# Patient Record
(demographics unavailable — no encounter records)

---

## 2024-10-11 NOTE — HISTORY OF PRESENT ILLNESS
[de-identified] : anemia  [FreeTextEntry6] : 16 year old female presenting for follow-up of anemia.   Pt reports history of anemia but never took iron supplementation. Pt complains of frequent headaches. Pt reports that she sometimes has shortness of breath. Pt denies difficulty concentrating, frequent stomachaches. Pt denies cravings for ice or things that are not food.   Pt eats a normal diet including meat. Pt does not drink a lot of milk.   Pt has regular, monthly period that lasted 7-9 days prior to the patch but period is shortener now on the patch. Pt denies blood in urine or stool. No history of constipation.

## 2024-10-11 NOTE — HISTORY OF PRESENT ILLNESS
[de-identified] : anemia  [FreeTextEntry6] : 16 year old female presenting for follow-up of anemia.   Pt reports history of anemia but never took iron supplementation. Pt complains of frequent headaches. Pt reports that she sometimes has shortness of breath. Pt denies difficulty concentrating, frequent stomachaches. Pt denies cravings for ice or things that are not food.   Pt eats a normal diet including meat. Pt does not drink a lot of milk.   Pt has regular, monthly period that lasted 7-9 days prior to the patch but period is shortener now on the patch. Pt denies blood in urine or stool. No history of constipation.

## 2024-10-29 NOTE — HISTORY OF PRESENT ILLNESS
[FreeTextEntry6] : Patient is 17yo female with onset of SOB last night associated with URI now with chest tightness PO2 96% but with nail extensions read has reduced reliability Congestion but no pharyngitis  Mother and GF smoke cigarettes at home Patient last vaped 1 week ago

## 2024-10-29 NOTE — PHYSICAL EXAM
[Wheezing] : wheezing [Tachypnea] : tachypnea [NL] : regular rate and rhythm, normal S1, S2 audible, no murmurs [Rales] : no rales [Crackles] : no crackles [FreeTextEntry7] : Wheezing inspiratory and expiratory with decreased air flow and prolonged on expiration

## 2024-10-29 NOTE — REVIEW OF SYSTEMS
[Nasal Discharge] : nasal discharge [Nasal Congestion] : nasal congestion [Cough] : cough [Congestion] : congestion [Shortness of Breath] : shortness of breath [Negative] : Musculoskeletal [Sore Throat] : no sore throat

## 2024-10-29 NOTE — DISCUSSION/SUMMARY
[FreeTextEntry1] : Patient is 15yo female seen for asthma exacerbation associated with URI since last night Ventolin 2 puffs at 9:20AM and again at 9:40AM Prednisone 40mg x 1 provided

## 2025-05-05 NOTE — HISTORY OF PRESENT ILLNESS
[de-identified] : STI testing  [FreeTextEntry6] : 16-year-old female presenting for STI testing.   Pt is asymptomatic - denies dysuria, abnormal vaginal itching, discharge, odor, or abdominal pain.   Last sexual activity: 1 year ago. Pt is planning to have sexual activity in near future with a female partner and wants to know status.   Pt reports asthma is well controlled.   Pt took iron pills in the fall of 2024 but never returned for follow-up. Pt reports cold intolerance. Pt denies dizziness, shortness of breath, trouble concentrating, fatigue, or cravings for ice or non-food items. Period is now lasting longer - 7 instead of 4 days in the past. Pt reports bleeding is heavier in the beginnning and then normal flow.

## 2025-05-05 NOTE — DISCUSSION/SUMMARY
[FreeTextEntry1] : 16-year-old female presenting for STI & HIV testing and anemia.   1) Sexual Health  -Ordered urine GC/CT.  -Ordered HIV screen.  -Counseled on safe sex practices with same sex partner. Given latex dams and finger condoms.   2) Anemia  -Ordered CBC & ferritin.  -Last labs done 9/25/24: Hgb 10.9 g/dl and ferritin 22.  -Return to health center in 1 week for resutls. Will start iron supplementation if needed.